# Patient Record
Sex: MALE | Race: WHITE | NOT HISPANIC OR LATINO | Employment: OTHER | URBAN - METROPOLITAN AREA
[De-identification: names, ages, dates, MRNs, and addresses within clinical notes are randomized per-mention and may not be internally consistent; named-entity substitution may affect disease eponyms.]

---

## 2020-08-03 ENCOUNTER — HOSPITAL ENCOUNTER (EMERGENCY)
Facility: MEDICAL CENTER | Age: 30
End: 2020-08-03
Attending: EMERGENCY MEDICINE

## 2020-08-03 VITALS
SYSTOLIC BLOOD PRESSURE: 158 MMHG | RESPIRATION RATE: 24 BRPM | OXYGEN SATURATION: 97 % | WEIGHT: 185 LBS | HEART RATE: 102 BPM | DIASTOLIC BLOOD PRESSURE: 105 MMHG | TEMPERATURE: 97.3 F | BODY MASS INDEX: 23 KG/M2 | HEIGHT: 75 IN

## 2020-08-03 DIAGNOSIS — F32.A DEPRESSION, UNSPECIFIED DEPRESSION TYPE: ICD-10-CM

## 2020-08-03 LAB
POC BREATHALIZER: 0.07 PERCENT (ref 0–0.01)
POC BREATHALIZER: 0.22 PERCENT (ref 0–0.01)

## 2020-08-03 PROCEDURE — 302970 POC BREATHALIZER: Performed by: EMERGENCY MEDICINE

## 2020-08-03 PROCEDURE — 99285 EMERGENCY DEPT VISIT HI MDM: CPT

## 2020-08-03 PROCEDURE — A9270 NON-COVERED ITEM OR SERVICE: HCPCS | Performed by: EMERGENCY MEDICINE

## 2020-08-03 PROCEDURE — 700102 HCHG RX REV CODE 250 W/ 637 OVERRIDE(OP): Performed by: EMERGENCY MEDICINE

## 2020-08-03 RX ORDER — LORAZEPAM 1 MG/1
2 TABLET ORAL ONCE
Status: COMPLETED | OUTPATIENT
Start: 2020-08-03 | End: 2020-08-03

## 2020-08-03 RX ORDER — LORAZEPAM 1 MG/1
1 TABLET ORAL ONCE
Status: COMPLETED | OUTPATIENT
Start: 2020-08-03 | End: 2020-08-03

## 2020-08-03 RX ORDER — LORAZEPAM 1 MG/1
1 TABLET ORAL ONCE
Status: DISCONTINUED | OUTPATIENT
Start: 2020-08-03 | End: 2020-08-03

## 2020-08-03 RX ORDER — LORAZEPAM 2 MG/ML
1 INJECTION INTRAMUSCULAR ONCE
Status: DISCONTINUED | OUTPATIENT
Start: 2020-08-03 | End: 2020-08-03 | Stop reason: HOSPADM

## 2020-08-03 RX ADMIN — LORAZEPAM 2 MG: 1 TABLET ORAL at 08:05

## 2020-08-03 RX ADMIN — LORAZEPAM 1 MG: 1 TABLET ORAL at 11:20

## 2020-08-03 SDOH — HEALTH STABILITY: MENTAL HEALTH: HOW OFTEN DO YOU HAVE A DRINK CONTAINING ALCOHOL?: 4 OR MORE TIMES A WEEK

## 2020-08-03 SDOH — HEALTH STABILITY: MENTAL HEALTH: HOW OFTEN DO YOU HAVE 6 OR MORE DRINKS ON ONE OCCASION?: DAILY OR ALMOST DAILY

## 2020-08-03 SDOH — HEALTH STABILITY: MENTAL HEALTH: HOW MANY STANDARD DRINKS CONTAINING ALCOHOL DO YOU HAVE ON A TYPICAL DAY?: 7 TO 9

## 2020-08-03 NOTE — CONSULTS
"[facility]   TRIAGE ASSESSMENT    Name: Jack Branch  MRN: [id number]  : 1990  Age: 30 y.o.  Date of assessment: 8/3/2020  PCP: Pcp Pt States None  Persons in attendance: Patient    CHIEF COMPLAINT/PRESENTING ISSUE (as stated by pt): Pt BIBA for [facility] last night after his girlfriend called 911 reporting pt voiced SI.  Pt allowed to sober in ER prior to my consult.  Upon my assessment, pt drowsy d/t recent Ativan administration d/t early stages of etoh w/d, but oriented x4.  He denied SI, HI and hallucinations.  He vaguely recalls voicing SI last night after a day of drinking and water sports at [facility].  Says he and his on again/off again girlfriend came over from [place] for recreation, but she ended up leaving the motel d/t him being so intoxicated.  He thinks he may have voiced SI via phone after she left angrily after arguing.  He admits to recent depression d/t couple's conflicts and the general state of a pandemic, but denies any plans or intent to end his life.  \"It's never gonna happen.  I don't have the balls to kill myself.\"  Contracts for safety.  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  Reports on again/off again girlfriend and several friends in [place] as support people.    BEHAVIORAL HEALTH TREATMENT HISTORY  Does patient/parent report a history of prior behavioral health treatment for patient?   No:  Denies any past inpt psych tx.  Was treated outpt as child for ADHD.    SAFETY ASSESSMENT - SELF  Does patient acknowledge current or past symptoms of dangerousness to self? No.  Denies any past SA.  Does parent/significant other report patient has current or past symptoms of dangerousness to self? N\A  Does presenting problem suggest symptoms of dangerousness to self? No    SAFETY ASSESSMENT - OTHERS  Does patient acknowledge current " "or past symptoms of aggressive behavior or risk to others? no  Does parent/significant other report patient has current or past symptoms of aggressive behavior or risk to others?  N\A  Does presenting problem suggest symptoms of dangerousness to others? No    Crisis Safety Plan completed and copy given to patient? no    ABUSE/NEGLECT SCREENING  Does patient report feeling “unsafe” in his/her home, or afraid of anyone?  no  Does patient report any history of physical, sexual, or emotional abuse?  Not assessed  Does parent or significant other report any of the above? N\A  Is there evidence of neglect by self?  no  Is there evidence of neglect by a caregiver? n/a  Does the patient/parent report any history of CPS/APS/police involvement related to suspected abuse/neglect or domestic violence? no  Based on the information provided during the current assessment, is a mandated report of suspected abuse/neglect being made?  No    SUBSTANCE USE SCREENING  Yes:  Domingo all substances used in the past 30 days:  Pt replied to me asking if he has ever considered abstaining from etoh, \"Why would I do that?\"  Pt not currently interested in treatment.      Last Use Amount   [x]   Alcohol daily 1/5 hard liquor    []   Marijuana     []   Heroin     []   Prescription Opioids  (used without prescription, for    recreation, or in excess of prescribed amount)     []   Other Prescription  (used without prescription, for    recreation, or in excess of prescribed amount)     []   Cocaine      []   Methamphetamine     []   \"\" drugs (ectasy, MDMA)     []   Other substances        UDS results: not done  Breathalyzer results: 0.219--0.072    What consequences does the patient associate with any of the above substance use and or addictive behaviors? None    Risk factors for detox (check all that apply):  []  Seizures   []  Diaphoretic (sweating)   []  Tremors   []  Hallucinations   []  Increased blood pressure   []  Decreased blood " pressure   []  Other   [x]  None      [x] Patient education on risk factors for detoxification and instructed to return to ER as needed.      MENTAL STATUS   Participation: Active verbal participation and Guarded  Grooming: Disheveled  Orientation: Fully Oriented and Drowsy/Somnolent  Behavior: Calm  Eye contact: Limited  Mood: Euthymic  Affect: Constricted  Thought process: Logical and Goal-directed  Thought content: Within normal limits  Speech: Rate within normal limits, Volume within normal limits and Hypotalkative  Perception: Within normal limits  Memory:  No gross evidence of memory deficits  Insight: Limited  Judgment:  Adequate  Other:    Collateral information: no past records in EMR  Source:  [] Significant other present in person:   [] Significant other by telephone  [] [facility]   [] [facility] Staff  [x] [facility]  [] Other:        CLINICAL IMPRESSIONS:  Primary:  SI-resolved upon sobriety  Secondary:  Alcohol use d/o       IDENTIFIED NEEDS/PLAN:  [Trigger DISPOSITION list for any items marked]    []  Imminent safety risk - self [] Imminent safety risk - others   [x]  Acute substance withdrawal []  Psychosis/Impaired reality testing   [x]  Mood/anxiety [x]  Substance use/Addictive behavior   []  Maladaptive behaviro []  Parent/child conflict   [x]  Family/Couples conflict []  Biomedical   []  Housing []  Financial   []   Legal  Occupational/Educational   []  Domestic violence []  Other:     Disposition: Refer to 12 Step program: AA or any treatment facility in [place].  Educated pt about the dangers of alcohol withdrawal and advised, should he ever want to quit, he seek assistance d/t the dangers of seizure, etc.    Does patient express agreement with the above plan? yes    Referral appointment(s) scheduled? N\A    Alert team only: Pt to DC to self once a little more alert.  He will need assistance with arranging with transport back to [facility], but should be able to  pay for it himself considering he is staying in motel there for vacation.  I have discussed findings and recommendations with Dr. Huang, who is in agreement with these recommendations.    Jacquelyn Dwyer R.N.  8/3/2020

## 2020-08-03 NOTE — ED NOTES
Pt states he is not ready to go yet.    Pt states he is staying in a hotel in [facility] and does not know the name of the hotel.  Spoke with Amada, in Case Coordination, and asked for her assistance.

## 2020-08-03 NOTE — ED NOTES
Medicated with Ativan per MAR  I told the pt that he needs to arrange a ride or call his girlfriend to arrange a ride.  Pt is speaking with his girlfriend now.

## 2020-08-03 NOTE — ED PROVIDER NOTES
"ED Provider Note    9:18 AM the patient is now sober.  Denies suicidal ideation.  He clearly has a problem with alcohol abuse.  He was evaluated by life skills.  They agree that this patient does not meet criteria for inpatient psychiatric treatment.  Does not meet legal 2000 criteria.  Offered alcohol abuse resources but the patient seems to lack insight.  States \"why would I quit drinking\".  Encouraged to return the emergency department for any suicidal feelings.  Contracts against self-harm.  Discharged in improved condition.    Final impression  1. Alcoholism /alcohol abuse (HCC)    2. Depression, unspecified depression type        "

## 2020-08-03 NOTE — ED NOTES
Discharge instructions given to pt with verbalized understanding.  Pt states his girlfriend has called him an uber.  Pt walked out to the ER entrance to wait for his uber, which should be here in 12 minutes.

## 2020-08-03 NOTE — ED PROVIDER NOTES
CHIEF COMPLAINT  Chief Complaint   Patient presents with   • Suicidal Ideation       HPI  Jack Branch is a 30 y.o. male who presents tonight via ambulance from [facility] for suicidal ideation and alcohol abuse.  Patient states he has been drinking at least 1/5 of fireball whiskey per day.  He states his been doing that steadily for over 3 months.  He states he never used to drink that much but because of COVID he became very depressed because he had lost his job.  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  He has a girlfriend who has a child who is the one who called 911 tonight secondary to his behavior and his statement of not wanting to live anymore.  He denies any specific suicidal plan but states that he knows he has extreme problems with alcohol and his ADHD is out of control.  He denies previous suicide attempts and has not been and currently is not on any antidepressants.    REVIEW OF SYSTEMS  See HPI for further details. All other system reviews are negative.    PAST MEDICAL HISTORY  ADHD  Chronic alcohol abuse    FAMILY HISTORY  History reviewed. No pertinent family history.    SOCIAL HISTORY  Social History     Socioeconomic History   • Marital status: Not on file     Spouse name: Not on file   • Number of children: Not on file   • Years of education: Not on file   • Highest education level: Not on file   Occupational History   • Not on file   Social Needs   • Financial resource strain: Not on file   • Food insecurity     Worry: Not on file     Inability: Not on file   • Transportation needs     Medical: Not on file     Non-medical: Not on file   Tobacco Use   • Smoking status: Current Every Day Smoker     Packs/day: 1.00   • Smokeless tobacco: Never Used   Substance and Sexual Activity   • Alcohol use: Yes     Frequency: 4 or more times a week     Drinks per session: 7 to 9     Binge frequency: Daily or almost daily      "Comment: fifth of Fire Ball   • Drug use: Yes     Comment: THC;   • Sexual activity: Not on file   Lifestyle   • Physical activity     Days per week: Not on file     Minutes per session: Not on file   • Stress: Not on file   Relationships   • Social connections     Talks on phone: Not on file     Gets together: Not on file     Attends Spiritism service: Not on file     Active member of club or organization: Not on file     Attends meetings of clubs or organizations: Not on file     Relationship status: Not on file   • Intimate partner violence     Fear of current or ex partner: Not on file     Emotionally abused: Not on file     Physically abused: Not on file     Forced sexual activity: Not on file   Other Topics Concern   • Not on file   Social History Narrative   • Not on file       SURGICAL HISTORY  History reviewed. No pertinent surgical history.    CURRENT MEDICATIONS  none    ALLERGIES  No Known Allergies    PHYSICAL EXAM  VITAL SIGNS: BP (!) 203/104   Pulse (!) 110   Temp 36.3 °C (97.3 °F) (Oral)   Resp 20   Ht 1.905 m (6' 3\")   Wt 83.9 kg (185 lb)   SpO2 96%   BMI 23.12 kg/m²     Constitutional: Patient is well developed, well nourished in extreme emotional distress, very tearful, agitated and anxious.  He is grossly intoxicated.  HENT: Normocephalic, Oropharynx moist , nose normal.   Eyes: PERRL, EOMI, Conjunctiva without erythema.   Neck: Supple with Normal range of motion in flexion, extension and lateral rotation. No tenderness along the bony prominences or paraspinal muscles.   Cardiovascular: Normal heart rate and rhythm. No murmur  Thorax & Lungs: Clear and equal breath sounds with good excursion. No respiratory distress.  Abdomen: Bowel sounds normal in all four quadrants. Soft,nontender.  Skin: Warm, Dry   Back: No cervical, thoracic, or lumbosacral tenderness.   Extremities: Peripheral pulses 4/4 No tenderness, no signs of trauma.  Musculoskeletal: Normal range of motion in all major joints. "   Neurologic: Alert & oriented x 3, Normal motor function, Normal sensory function. DTR's 4/4 bilaterally.  Psychiatric: Patient is grossly intoxicated at this time but very pleasant and cooperative.  He does state that he needs help for his alcohol and depression as well as his ADHD.  He is very tearful.    Results for orders placed or performed during the hospital encounter of 08/03/20   POC BREATHALIZER   Result Value Ref Range    POC Breathalizer 0.219 (A) 0.00 - 0.01 Percent       COURSE & MEDICAL DECISION MAKING  Pertinent Labs & Imaging studies reviewed. (See chart for details)  I ordered laboratories on the patient since he states he was not feeling well and is been having near syncopal episodes but he refused to have his blood drawn per nursing staff stating that he had it done 2 months ago at [place] and everything was fine.  Patient's breathalyzer was 0.21.  He will be observed until it is less than 0.08 and then a behavioral health consultation at that time.  He is currently in stable condition.    FINAL IMPRESSION  1.  Acute alcohol intoxication  2.  Chronic alcohol abuse  3.  History of ADHD  4.  Suicidal ideation         Electronically signed by: Neda Centeno D.O., 8/3/2020 2:46 [facility] Provider Note

## 2020-08-03 NOTE — DISCHARGE PLANNING
Call to [facility] left [phone] to see where picked up from.[facility]  also called at   spoke with Abbi. Picked up at [address] [facility] [address] , [place] . MD notes show He has a girlfriend who has a child who is the one who called 911. Unsure if they would be willing to pick him up. Not listed on facesheet.Cab cost may be high with distance ( [employer] estimate -95.00), may explore other options, pt likely can afford transport. Uber 50.00 approx